# Patient Record
Sex: MALE | Race: BLACK OR AFRICAN AMERICAN | NOT HISPANIC OR LATINO | Employment: OTHER | URBAN - METROPOLITAN AREA
[De-identification: names, ages, dates, MRNs, and addresses within clinical notes are randomized per-mention and may not be internally consistent; named-entity substitution may affect disease eponyms.]

---

## 2023-08-23 LAB
ALANINE AMINOTRANSFERASE (SGPT) (U/L) IN SER/PLAS: 11 U/L (ref 10–52)
ALBUMIN (G/DL) IN SER/PLAS: 4.3 G/DL (ref 3.4–5)
ALKALINE PHOSPHATASE (U/L) IN SER/PLAS: 56 U/L (ref 33–136)
ASPARTATE AMINOTRANSFERASE (SGOT) (U/L) IN SER/PLAS: 14 U/L (ref 9–39)
BASOPHILS (10*3/UL) IN BLOOD BY AUTOMATED COUNT: 0.06 X10E9/L (ref 0–0.1)
BASOPHILS/100 LEUKOCYTES IN BLOOD BY AUTOMATED COUNT: 0.9 % (ref 0–2)
BILIRUBIN DIRECT (MG/DL) IN SER/PLAS: 0.1 MG/DL (ref 0–0.3)
BILIRUBIN TOTAL (MG/DL) IN SER/PLAS: 0.4 MG/DL (ref 0–1.2)
EOSINOPHILS (10*3/UL) IN BLOOD BY AUTOMATED COUNT: 0.47 X10E9/L (ref 0–0.4)
EOSINOPHILS/100 LEUKOCYTES IN BLOOD BY AUTOMATED COUNT: 7.2 % (ref 0–6)
ERYTHROCYTE DISTRIBUTION WIDTH (RATIO) BY AUTOMATED COUNT: 11.8 % (ref 11.5–14.5)
ERYTHROCYTE MEAN CORPUSCULAR HEMOGLOBIN CONCENTRATION (G/DL) BY AUTOMATED: 32 G/DL (ref 32–36)
ERYTHROCYTE MEAN CORPUSCULAR VOLUME (FL) BY AUTOMATED COUNT: 102 FL (ref 80–100)
ERYTHROCYTES (10*6/UL) IN BLOOD BY AUTOMATED COUNT: 4.18 X10E12/L (ref 4.5–5.9)
HEMATOCRIT (%) IN BLOOD BY AUTOMATED COUNT: 42.8 % (ref 41–52)
HEMOGLOBIN (G/DL) IN BLOOD: 13.7 G/DL (ref 13.5–17.5)
HEPATITIS A TOTAL AB INTERPRETATION: REACTIVE
HEPATITIS B VIRUS CORE AB (PRESENCE) IN SER/PLAS BY IMM: NONREACTIVE
HEPATITIS B VIRUS SURFACE AB (MIU/ML) IN SERUM: >1000 MIU/ML
HEPATITIS B VIRUS SURFACE AG PRESENCE IN SERUM: NONREACTIVE
HEPATITIS C VIRUS AB PRESENCE IN SERUM: REACTIVE
HIV 1/ 2 AG/AB SCREEN: NONREACTIVE
IMMATURE GRANULOCYTES/100 LEUKOCYTES IN BLOOD BY AUTOMATED COUNT: 0.2 % (ref 0–0.9)
INR IN PPP BY COAGULATION ASSAY: NORMAL
LEUKOCYTES (10*3/UL) IN BLOOD BY AUTOMATED COUNT: 6.5 X10E9/L (ref 4.4–11.3)
LYMPHOCYTES (10*3/UL) IN BLOOD BY AUTOMATED COUNT: 3.12 X10E9/L (ref 0.8–3)
LYMPHOCYTES/100 LEUKOCYTES IN BLOOD BY AUTOMATED COUNT: 48 % (ref 13–44)
MONOCYTES (10*3/UL) IN BLOOD BY AUTOMATED COUNT: 0.83 X10E9/L (ref 0.05–0.8)
MONOCYTES/100 LEUKOCYTES IN BLOOD BY AUTOMATED COUNT: 12.8 % (ref 2–10)
NEUTROPHILS (10*3/UL) IN BLOOD BY AUTOMATED COUNT: 2.01 X10E9/L (ref 1.6–5.5)
NEUTROPHILS/100 LEUKOCYTES IN BLOOD BY AUTOMATED COUNT: 30.9 % (ref 40–80)
NRBC (PER 100 WBCS) BY AUTOMATED COUNT: 0 /100 WBC (ref 0–0)
PLATELETS (10*3/UL) IN BLOOD AUTOMATED COUNT: 196 X10E9/L (ref 150–450)
PROTEIN TOTAL: 7.1 G/DL (ref 6.4–8.2)
PROTHROMBIN TIME (PT) IN PPP BY COAGULATION ASSAY: NORMAL

## 2023-08-24 LAB
HCV PCR QUANT: NOT DETECTED IU/ML
HCV RNA, PCR LOG: NORMAL LOG10 IU/ML

## 2023-09-27 DIAGNOSIS — M25.512 LEFT SHOULDER PAIN, UNSPECIFIED CHRONICITY: Primary | ICD-10-CM

## 2023-09-29 ENCOUNTER — HOSPITAL ENCOUNTER (OUTPATIENT)
Dept: DATA CONVERSION | Facility: HOSPITAL | Age: 73
End: 2023-09-29

## 2023-10-06 ENCOUNTER — DOCUMENTATION (OUTPATIENT)
Dept: PHYSICAL THERAPY | Facility: CLINIC | Age: 73
End: 2023-10-06
Payer: MEDICARE

## 2023-10-06 NOTE — PROGRESS NOTES
Physical Therapy                 Therapy Communication Note    Patient Name: Monae Berman  MRN: [id number]  Today's Date: 10/6/2023     Discipline: Physical Therapy    Missed Visit Reason:      Missed Time: No Show    Comment:

## 2023-10-13 ENCOUNTER — DOCUMENTATION (OUTPATIENT)
Dept: PHYSICAL THERAPY | Facility: CLINIC | Age: 73
End: 2023-10-13
Payer: MEDICARE

## 2023-10-13 NOTE — PROGRESS NOTES
Physical Therapy                 Therapy Communication Note    Patient Name: Monae Berman  MRN: [id number]  Today's Date: 10/13/2023     Discipline: Physical Therapy    Missed Visit Reason:      Missed Time: No Show    Comment:

## 2023-10-20 ENCOUNTER — DOCUMENTATION (OUTPATIENT)
Dept: PHYSICAL THERAPY | Facility: CLINIC | Age: 73
End: 2023-10-20
Payer: MEDICARE

## 2023-10-20 NOTE — PROGRESS NOTES
Physical Therapy                 Therapy Communication Note    Patient Name: Monae Berman  MRN: [id number]  Today's Date: 10/20/2023     Discipline: Physical Therapy    Missed Visit Reason:      Missed Time: No Show    Comment:

## 2023-10-27 ENCOUNTER — DOCUMENTATION (OUTPATIENT)
Dept: PHYSICAL THERAPY | Facility: CLINIC | Age: 73
End: 2023-10-27
Payer: MEDICARE

## 2023-10-27 NOTE — PROGRESS NOTES
Physical Therapy                 Therapy Communication Note    Patient Name: Monae Berman  MRN: [id number]  Today's Date: 10/27/2023     Discipline: Physical Therapy    Missed Visit Reason:      Missed Time: No Show    Comment:

## 2023-10-31 ENCOUNTER — DOCUMENTATION (OUTPATIENT)
Dept: PHYSICAL THERAPY | Facility: CLINIC | Age: 73
End: 2023-10-31
Payer: MEDICARE

## 2023-10-31 NOTE — PROGRESS NOTES
Physical Therapy                 Therapy Communication Note    Patient Name: Monae Berman  MRN: [id number]  Today's Date: 10/31/2023     Discipline: Physical Therapy    Missed Visit Reason:      Missed Time: No Show    Comment: Last scheduled.

## 2023-11-02 ENCOUNTER — DOCUMENTATION (OUTPATIENT)
Dept: PHYSICAL THERAPY | Facility: CLINIC | Age: 73
End: 2023-11-02
Payer: MEDICARE

## 2023-11-02 NOTE — PROGRESS NOTES
Physical Therapy    Discharge Summary    Name: Monae Berman  MRN: [id number]  : 1950  Date: 23    Discharge Summary: PT    Discharge Information: Date of discharge 10/31/2023    Therapy Summary: Pt with poor follow-up to PT.     Discharge Status: Discharge, follow-up with PCP     Rehab Discharge Reason: Attendance inconsistent and Failed to schedule and/or keep follow-up appointment(s)

## 2023-11-10 PROBLEM — M79.674 PAIN IN TOES OF BOTH FEET: Status: ACTIVE | Noted: 2023-11-10

## 2023-11-10 PROBLEM — M25.512 LEFT SHOULDER PAIN: Status: ACTIVE | Noted: 2023-11-10

## 2023-11-10 PROBLEM — B35.1 ONYCHOMYCOSIS: Status: ACTIVE | Noted: 2023-11-10

## 2023-11-10 PROBLEM — M79.675 PAIN IN TOES OF BOTH FEET: Status: ACTIVE | Noted: 2023-11-10

## 2023-11-10 RX ORDER — ATORVASTATIN CALCIUM 40 MG/1
40 TABLET, FILM COATED ORAL NIGHTLY
COMMUNITY
Start: 2023-01-25 | End: 2024-03-27 | Stop reason: SDUPTHER

## 2023-11-10 RX ORDER — POLYVINYL ALCOHOL 14 MG/ML
SOLUTION/ DROPS OPHTHALMIC
COMMUNITY
Start: 2017-11-16

## 2023-11-10 RX ORDER — TAMSULOSIN HYDROCHLORIDE 0.4 MG/1
0.4 CAPSULE ORAL NIGHTLY
COMMUNITY
Start: 2023-01-25 | End: 2024-01-07

## 2023-11-10 RX ORDER — ASPIRIN 325 MG
TABLET, DELAYED RELEASE (ENTERIC COATED) ORAL
COMMUNITY
Start: 2017-05-23

## 2023-11-10 RX ORDER — MULTIVITAMIN WITH FOLIC ACID 400 MCG
1 TABLET ORAL DAILY
COMMUNITY
End: 2024-01-07

## 2023-11-10 RX ORDER — ASPIRIN 81 MG/1
81 TABLET ORAL DAILY
COMMUNITY
Start: 2023-01-25

## 2023-11-10 RX ORDER — LEDIPASVIR AND SOFOSBUVIR 90; 400 MG/1; MG/1
TABLET, FILM COATED ORAL
COMMUNITY
Start: 2017-08-01

## 2023-11-10 RX ORDER — MICONAZOLE NITRATE 2 %
CREAM (GRAM) TOPICAL
COMMUNITY
Start: 2017-02-02

## 2023-11-10 RX ORDER — SILDENAFIL 100 MG/1
TABLET, FILM COATED ORAL
COMMUNITY
Start: 2023-08-23 | End: 2024-03-27 | Stop reason: SDUPTHER

## 2023-11-10 RX ORDER — FINASTERIDE 5 MG/1
5 TABLET, FILM COATED ORAL DAILY
COMMUNITY
Start: 2023-01-25 | End: 2024-03-27 | Stop reason: SDUPTHER

## 2023-11-10 RX ORDER — AMPICILLIN TRIHYDRATE 500 MG
25 CAPSULE ORAL DAILY
COMMUNITY

## 2024-01-05 DIAGNOSIS — R39.9 LOWER URINARY TRACT SYMPTOMS (LUTS): Primary | ICD-10-CM

## 2024-01-05 DIAGNOSIS — Z00.00 HEALTHCARE MAINTENANCE: ICD-10-CM

## 2024-01-07 RX ORDER — TAMSULOSIN HYDROCHLORIDE 0.4 MG/1
0.4 CAPSULE ORAL NIGHTLY
Qty: 90 CAPSULE | Refills: 0 | Status: SHIPPED | OUTPATIENT
Start: 2024-01-07 | End: 2024-06-04

## 2024-01-07 RX ORDER — MULTIVITAMIN WITH FOLIC ACID 400 MCG
1 TABLET ORAL DAILY
Qty: 90 TABLET | Refills: 0 | Status: SHIPPED | OUTPATIENT
Start: 2024-01-07 | End: 2024-03-27 | Stop reason: SDUPTHER

## 2024-03-27 ENCOUNTER — OFFICE VISIT (OUTPATIENT)
Dept: PRIMARY CARE | Facility: CLINIC | Age: 74
End: 2024-03-27
Payer: MEDICARE

## 2024-03-27 VITALS
DIASTOLIC BLOOD PRESSURE: 69 MMHG | WEIGHT: 179 LBS | HEART RATE: 75 BPM | OXYGEN SATURATION: 97 % | SYSTOLIC BLOOD PRESSURE: 101 MMHG | HEIGHT: 72 IN | BODY MASS INDEX: 24.24 KG/M2

## 2024-03-27 DIAGNOSIS — E78.5 HYPERLIPIDEMIA, UNSPECIFIED HYPERLIPIDEMIA TYPE: Primary | ICD-10-CM

## 2024-03-27 DIAGNOSIS — Z00.00 HEALTHCARE MAINTENANCE: ICD-10-CM

## 2024-03-27 DIAGNOSIS — N40.1 BENIGN PROSTATIC HYPERPLASIA WITH URINARY FREQUENCY: ICD-10-CM

## 2024-03-27 DIAGNOSIS — K76.9 LIVER DISEASE, UNSPECIFIED: ICD-10-CM

## 2024-03-27 DIAGNOSIS — N52.9 ERECTILE DISORDER: ICD-10-CM

## 2024-03-27 DIAGNOSIS — R35.0 BENIGN PROSTATIC HYPERPLASIA WITH URINARY FREQUENCY: ICD-10-CM

## 2024-03-27 DIAGNOSIS — S12.101S CLOSED NONDISPLACED FRACTURE OF SECOND CERVICAL VERTEBRA, UNSPECIFIED FRACTURE MORPHOLOGY, SEQUELA: ICD-10-CM

## 2024-03-27 PROCEDURE — 1159F MED LIST DOCD IN RCRD: CPT | Performed by: NURSE PRACTITIONER

## 2024-03-27 PROCEDURE — 99213 OFFICE O/P EST LOW 20 MIN: CPT | Performed by: NURSE PRACTITIONER

## 2024-03-27 RX ORDER — FINASTERIDE 5 MG/1
5 TABLET, FILM COATED ORAL DAILY
Qty: 90 TABLET | Refills: 3 | Status: SHIPPED | OUTPATIENT
Start: 2024-03-27 | End: 2025-03-27

## 2024-03-27 RX ORDER — SILDENAFIL 100 MG/1
100 TABLET, FILM COATED ORAL AS NEEDED
Qty: 30 TABLET | Refills: 1 | Status: SHIPPED | OUTPATIENT
Start: 2024-03-27

## 2024-03-27 RX ORDER — MULTIVITAMIN WITH FOLIC ACID 400 MCG
1 TABLET ORAL DAILY
Qty: 90 TABLET | Refills: 0 | Status: SHIPPED | OUTPATIENT
Start: 2024-03-27

## 2024-03-27 RX ORDER — ATORVASTATIN CALCIUM 40 MG/1
40 TABLET, FILM COATED ORAL NIGHTLY
Qty: 90 TABLET | Refills: 3 | Status: SHIPPED | OUTPATIENT
Start: 2024-03-27 | End: 2025-03-27

## 2024-03-27 NOTE — PATIENT INSTRUCTIONS
Make an appointment to follow up with Dr. Aman Bell [phone], or call her office at [phone] Denise Veras ()    Come back to see me in 3months

## 2024-03-27 NOTE — PROGRESS NOTES
Subjective   Patient ID: Monae Berman is a 73 y.o. male who presents for Annual Exam.    HPI   Mr. Berman is here for follow up. Hx of an accident falling of a roof. Initially was unable to move extremities, but now has arm movement and leg strength with left hemiplegia. New concern is his rotator cuff requesting referral for therapy. Reviewed medications. Needs to follow up with Hepatology for History of Hep C.     Review of Systems  12 point review of systems including (Constitutional, Eyes, ENMT, Respiratory, Cardiac, Gastrointestinal, Neurological, Psychiatric, and Hematologic) was performed and is otherwise negative     Objective   /69   Pulse 75   Ht 1.829 m (6')   Wt 81.2 kg (179 lb)   SpO2 97%   BMI 24.28 kg/m²     Physical Exam  General: Well groomed. Mood appropriate.  HEENT: MMM   Chest: CTA  Heart: RRR  Skin: warm, moist, intact buttock was reported by patient    Assessment/Plan   Diagnoses and all orders for this visit:  Hyperlipidemia, unspecified hyperlipidemia type  -     atorvastatin (Lipitor) 40 mg tablet; Take 1 tablet (40 mg) by mouth once daily at bedtime.  -     Lipid Panel; Future  Healthcare maintenance  -     multivitamin (Daily-Jenn, with folic acid,) tablet; Take 1 tablet by mouth once daily.  -     CBC; Future  -     Comprehensive Metabolic Panel; Future  -     Vitamin D 25-Hydroxy,Total (for eval of Vitamin D levels); Future  Erectile disorder  -     sildenafil (Viagra) 100 mg tablet; Take 1 tablet (100 mg) by mouth if needed for erectile dysfunction. Take 30-60 minutes before intercourse. Do not use more often than once a day. Use Condoms.  Benign prostatic hyperplasia with urinary frequency  -     finasteride (Proscar) 5 mg tablet; Take 1 tablet (5 mg) by mouth once daily.  Closed nondisplaced fracture of second cervical vertebra, unspecified fracture morphology, sequela  -     Walker rolling  Liver disease, unspecified  -     CBC; Future  -     Vitamin D 25-Hydroxy,Total  (for eval of Vitamin D levels); Future

## 2024-03-30 DIAGNOSIS — G89.29 CHRONIC LEFT SHOULDER PAIN: Primary | ICD-10-CM

## 2024-03-30 DIAGNOSIS — M25.512 CHRONIC LEFT SHOULDER PAIN: Primary | ICD-10-CM

## 2024-06-03 DIAGNOSIS — R39.9 LOWER URINARY TRACT SYMPTOMS (LUTS): ICD-10-CM

## 2024-06-04 RX ORDER — TAMSULOSIN HYDROCHLORIDE 0.4 MG/1
0.4 CAPSULE ORAL NIGHTLY
Qty: 90 CAPSULE | Refills: 0 | Status: SHIPPED | OUTPATIENT
Start: 2024-06-04

## 2024-06-19 ENCOUNTER — OFFICE VISIT (OUTPATIENT)
Dept: PRIMARY CARE | Facility: CLINIC | Age: 74
End: 2024-06-19
Payer: MEDICARE

## 2024-06-19 VITALS
DIASTOLIC BLOOD PRESSURE: 73 MMHG | OXYGEN SATURATION: 98 % | RESPIRATION RATE: 18 BRPM | HEART RATE: 97 BPM | TEMPERATURE: 97.9 F | SYSTOLIC BLOOD PRESSURE: 105 MMHG

## 2024-06-19 DIAGNOSIS — S12.100G CLOSED DISPLACED FRACTURE OF SECOND CERVICAL VERTEBRA WITH DELAYED HEALING, UNSPECIFIED FRACTURE MORPHOLOGY, SUBSEQUENT ENCOUNTER: Primary | ICD-10-CM

## 2024-06-19 DIAGNOSIS — N52.9 ERECTILE DISORDER: ICD-10-CM

## 2024-06-19 DIAGNOSIS — E55.9 HYPOVITAMINOSIS D: ICD-10-CM

## 2024-06-19 DIAGNOSIS — R39.9 LOWER URINARY TRACT SYMPTOMS (LUTS): ICD-10-CM

## 2024-06-19 DIAGNOSIS — E86.0 DEHYDRATION: ICD-10-CM

## 2024-06-19 LAB
ALBUMIN SERPL BCP-MCNC: 4.7 G/DL (ref 3.4–5)
ANION GAP SERPL CALC-SCNC: 17 MMOL/L (ref 10–20)
BUN SERPL-MCNC: 18 MG/DL (ref 6–23)
CALCIUM SERPL-MCNC: 9.9 MG/DL (ref 8.6–10.6)
CHLORIDE SERPL-SCNC: 101 MMOL/L (ref 98–107)
CO2 SERPL-SCNC: 21 MMOL/L (ref 21–32)
CREAT SERPL-MCNC: 1.17 MG/DL (ref 0.5–1.3)
EGFRCR SERPLBLD CKD-EPI 2021: 66 ML/MIN/1.73M*2
GLUCOSE SERPL-MCNC: 105 MG/DL (ref 74–99)
PHOSPHATE SERPL-MCNC: 3.6 MG/DL (ref 2.5–4.9)
POTASSIUM SERPL-SCNC: 5.2 MMOL/L (ref 3.5–5.3)
SODIUM SERPL-SCNC: 134 MMOL/L (ref 136–145)

## 2024-06-19 PROCEDURE — 80069 RENAL FUNCTION PANEL: CPT | Performed by: NURSE PRACTITIONER

## 2024-06-19 PROCEDURE — 1126F AMNT PAIN NOTED NONE PRSNT: CPT | Performed by: NURSE PRACTITIONER

## 2024-06-19 PROCEDURE — 99213 OFFICE O/P EST LOW 20 MIN: CPT | Performed by: NURSE PRACTITIONER

## 2024-06-19 PROCEDURE — 1159F MED LIST DOCD IN RCRD: CPT | Performed by: NURSE PRACTITIONER

## 2024-06-19 PROCEDURE — 36415 COLL VENOUS BLD VENIPUNCTURE: CPT | Performed by: NURSE PRACTITIONER

## 2024-06-19 RX ORDER — AMPICILLIN TRIHYDRATE 500 MG
25 CAPSULE ORAL DAILY
Qty: 90 CAPSULE | Refills: 3 | Status: SHIPPED | OUTPATIENT
Start: 2024-06-19 | End: 2025-06-19

## 2024-06-19 RX ORDER — SILDENAFIL 100 MG/1
100 TABLET, FILM COATED ORAL AS NEEDED
Qty: 30 TABLET | Refills: 1 | Status: SHIPPED | OUTPATIENT
Start: 2024-06-19

## 2024-06-19 SDOH — ECONOMIC STABILITY: FOOD INSECURITY: WITHIN THE PAST 12 MONTHS, THE FOOD YOU BOUGHT JUST DIDN'T LAST AND YOU DIDN'T HAVE MONEY TO GET MORE.: SOMETIMES TRUE

## 2024-06-19 SDOH — ECONOMIC STABILITY: FOOD INSECURITY: WITHIN THE PAST 12 MONTHS, YOU WORRIED THAT YOUR FOOD WOULD RUN OUT BEFORE YOU GOT MONEY TO BUY MORE.: SOMETIMES TRUE

## 2024-06-19 ASSESSMENT — ENCOUNTER SYMPTOMS
OCCASIONAL FEELINGS OF UNSTEADINESS: 1
DEPRESSION: 0
LOSS OF SENSATION IN FEET: 1

## 2024-06-19 ASSESSMENT — PAIN SCALES - GENERAL: PAINLEVEL: 0-NO PAIN

## 2024-06-19 ASSESSMENT — LIFESTYLE VARIABLES: HOW MANY STANDARD DRINKS CONTAINING ALCOHOL DO YOU HAVE ON A TYPICAL DAY: PATIENT DOES NOT DRINK

## 2024-06-19 NOTE — PROGRESS NOTES
Subjective   Patient ID: Monae Berman is a 73 y.o. male who presents for Annual Exam (PHYSICAL).    HPI   PMH significant for an accident falling off of a roof. Initially was unable to move extremities, but now has arm movement and some leg strength with left hemiplegia. He is Hep C positive and has not been seen by hepatology since May 2023 was to be seen in 3 months.  Labs done at that time patient was positive for Hep c antibodies, liver scan should mild fibrotic changes, has not received treatment.  Patients concern today is the need for a hospital bed and a walker. He has been sleeping on a couch and it is not at all comfortable. He needs the ability to make adjustments. Also will need PT again to work with him with the walker.    Review of Systems  12 point review of systems including (Constitutional, Eyes, ENMT, Respiratory, Cardiac, Gastrointestinal, Neurological, Psychiatric, and Hematologic) was performed and is otherwise negative     Objective   /73   Pulse 97   Temp 36.6 °C (97.9 °F)   Resp 18   SpO2 98%     Physical Exam  General: Poorly groomed with urine odor and soiled clothing. Mood appropriate.  HEENT: Dry oral cavity  Chest: CTA  Heart: RRR  Ext: no edema  Skin: warm, moist, intact   Assessment/Plan   Diagnoses and all orders for this visit:  Closed displaced fracture of second cervical vertebra with delayed healing, unspecified fracture morphology, subsequent encounter  -     Hospital Bed  -     walker misc; One Rolator walker for support in patient with C2 fracture now healed.  -     Referral to Physical Therapy; Future  Erectile disorder  -     sildenafil (Viagra) 100 mg tablet; Take 1 tablet (100 mg) by mouth if needed for erectile dysfunction. Take 30-60 minutes before intercourse. Do not use more often than once a day. Use Condoms.  Hypovitaminosis D  -     Vitamin D3 25 mcg (1,000 unit) capsule; Take 1 capsule (25 mcg) by mouth once daily.  Dehydration  -     Renal Function  Panel  - Encouraged increased liquids especially water.   Other orders  -     Follow Up In Primary Care - Established; Future     Patient was identified as a fall risk. Risk prevention instructions provided.

## 2024-06-21 PROBLEM — R39.9 LOWER URINARY TRACT SYMPTOMS (LUTS): Status: ACTIVE | Noted: 2024-06-21

## 2024-06-21 PROBLEM — N52.9 ERECTILE DISORDER: Status: ACTIVE | Noted: 2024-06-21

## 2024-06-21 PROBLEM — E55.9 HYPOVITAMINOSIS D: Status: ACTIVE | Noted: 2024-06-21

## 2024-07-18 ENCOUNTER — APPOINTMENT (OUTPATIENT)
Dept: RADIOLOGY | Facility: HOSPITAL | Age: 74
End: 2024-07-18
Payer: MEDICARE

## 2024-07-18 ENCOUNTER — HOSPITAL ENCOUNTER (EMERGENCY)
Facility: HOSPITAL | Age: 74
Discharge: HOME | End: 2024-07-18
Attending: EMERGENCY MEDICINE
Payer: MEDICARE

## 2024-07-18 VITALS
DIASTOLIC BLOOD PRESSURE: 80 MMHG | BODY MASS INDEX: 24.38 KG/M2 | TEMPERATURE: 97.3 F | OXYGEN SATURATION: 100 % | HEART RATE: 81 BPM | WEIGHT: 180 LBS | RESPIRATION RATE: 18 BRPM | SYSTOLIC BLOOD PRESSURE: 116 MMHG | HEIGHT: 72 IN

## 2024-07-18 DIAGNOSIS — M25.512 ACUTE PAIN OF LEFT SHOULDER: Primary | ICD-10-CM

## 2024-07-18 PROCEDURE — 99284 EMERGENCY DEPT VISIT MOD MDM: CPT

## 2024-07-18 PROCEDURE — 73080 X-RAY EXAM OF ELBOW: CPT | Mod: LT

## 2024-07-18 PROCEDURE — 73080 X-RAY EXAM OF ELBOW: CPT | Mod: LEFT SIDE | Performed by: RADIOLOGY

## 2024-07-18 PROCEDURE — 73030 X-RAY EXAM OF SHOULDER: CPT | Mod: LT

## 2024-07-18 PROCEDURE — 2500000001 HC RX 250 WO HCPCS SELF ADMINISTERED DRUGS (ALT 637 FOR MEDICARE OP): Mod: SE

## 2024-07-18 PROCEDURE — 73030 X-RAY EXAM OF SHOULDER: CPT | Mod: LEFT SIDE | Performed by: RADIOLOGY

## 2024-07-18 PROCEDURE — 99284 EMERGENCY DEPT VISIT MOD MDM: CPT | Performed by: EMERGENCY MEDICINE

## 2024-07-18 RX ORDER — ACETAMINOPHEN 325 MG/1
975 TABLET ORAL ONCE
Status: COMPLETED | OUTPATIENT
Start: 2024-07-18 | End: 2024-07-18

## 2024-07-18 RX ORDER — ACETAMINOPHEN 325 MG/1
650 TABLET ORAL EVERY 6 HOURS PRN
Qty: 80 TABLET | Refills: 0 | Status: SHIPPED | OUTPATIENT
Start: 2024-07-18 | End: 2024-07-28

## 2024-07-18 RX ORDER — OXYCODONE HYDROCHLORIDE 5 MG/1
5 TABLET ORAL ONCE
Status: COMPLETED | OUTPATIENT
Start: 2024-07-18 | End: 2024-07-18

## 2024-07-18 ASSESSMENT — PAIN DESCRIPTION - ORIENTATION
ORIENTATION: LEFT

## 2024-07-18 ASSESSMENT — PAIN DESCRIPTION - LOCATION
LOCATION: SHOULDER
LOCATION: ARM
LOCATION: SHOULDER

## 2024-07-18 ASSESSMENT — PAIN SCALES - GENERAL
PAINLEVEL_OUTOF10: 10 - WORST POSSIBLE PAIN

## 2024-07-18 ASSESSMENT — PAIN DESCRIPTION - PAIN TYPE: TYPE: ACUTE PAIN

## 2024-07-18 ASSESSMENT — COLUMBIA-SUICIDE SEVERITY RATING SCALE - C-SSRS
2. HAVE YOU ACTUALLY HAD ANY THOUGHTS OF KILLING YOURSELF?: NO
1. IN THE PAST MONTH, HAVE YOU WISHED YOU WERE DEAD OR WISHED YOU COULD GO TO SLEEP AND NOT WAKE UP?: NO
6. HAVE YOU EVER DONE ANYTHING, STARTED TO DO ANYTHING, OR PREPARED TO DO ANYTHING TO END YOUR LIFE?: NO

## 2024-07-18 ASSESSMENT — PAIN - FUNCTIONAL ASSESSMENT: PAIN_FUNCTIONAL_ASSESSMENT: 0-10

## 2024-07-18 ASSESSMENT — PAIN SCALES - PAIN ASSESSMENT IN ADVANCED DEMENTIA (PAINAD): TOTALSCORE: MEDICATION (SEE MAR)

## 2024-07-18 NOTE — ED TRIAGE NOTES
Pt to ED c/o left shoulder pain after a fall last evening trying to get up out of bed. Pt states he did not hit his head or lose consciousness. Pt denies any use of thinners. Pt recently tore his rotator cuff and after his neighbors helped him get up, he feels like his shoulder is dislocated.

## 2024-07-18 NOTE — ED PROVIDER NOTES
CC: Shoulder Pain     History provided by: Patient  Limitations to History: None    HPI:  Patient is a 73-year-old male with a PMH of erectile dysfunction who presents to the emergency department for a chief complaint of left shoulder pain.  Patient reports a fall upon arising from his bed yesterday.  He reports that he fell on his left shoulder.  Patient denies head strike, loss of consciousness, or anticoagulation use.  Patient denies any preceding symptoms of chest pain, shortness of breath, or dizziness.  Patient reports a history of chronic rotator cuff tear of his left shoulder.  Patient denies chest pain, or shortness of breath, abdominal pain.    External Records Reviewed: Previous ED records, inpatient records, and outpatient records   ???????????????????????????????????????????????????????????????  Triage Vitals:  T 36.3 °C (97.3 °F)  HR 81  /80  RR 18  O2 100 %      Physical Exam  Constitutional:       General: He is awake. He is not in acute distress.     Appearance: He is not ill-appearing, toxic-appearing or diaphoretic.   Cardiovascular:      Rate and Rhythm: Normal rate and regular rhythm.      Heart sounds: Normal heart sounds, S1 normal and S2 normal. Heart sounds not distant. No murmur heard.  Pulmonary:      Effort: Pulmonary effort is normal. No respiratory distress.      Breath sounds: Normal breath sounds.   Musculoskeletal:      Comments: 5/5 strength in Left shoulder flexion, extension, abduction, and abduction.  The left upper extremity is neuro intact in all nerve distributions.  There are no overt signs of trauma.  There is tenderness to palpitation over the left olecranon and left AC joint.  Empty can testing is positive on the left.   Neurological:      Mental Status: He is alert.   Psychiatric:         Behavior: Behavior is cooperative.        ???????????????????????????????????????????????????????????????  ED Course/Treatment/Medical Decision Making    Independent  Interpretation of Studies:  I independently interpreted: Left shoulder x-ray, left elbow x-ray    Differential diagnoses considered include but ar not limited to: Shoulder fracture, shoulder dislocation, left elbow fracture, muscular contusion, muscular strain, rotator cuff tear    Social Determinants Limiting Care:  None identified         ED Course:  ED Course as of 07/21/24 2320   Thu Jul 18, 2024   0859 Patient coming in after he landed on the ground out of his bed and landed on his left shoulder.  He thinks that it might be dislocated or possibly fractured.  He was initially placed in 39 otto but was found to have bedbugs so he is being moved into a room.  Plan to undress, examine, and obtain x-rays.  No reported head trauma.  No reported chest pain shortness of breath or loss of consciousness. [DM]      ED Course User Index  [DM] Norberto Pederson MD         Diagnoses as of 07/21/24 2320   Acute pain of left shoulder       MDM:    Patient is a 73-year-old male with above PMH who presents to the emergency department for a chief complaint of left shoulder pain.  Upon arrival patient's vital signs are within normal limits, he is nontoxic-appearing, and appears in no acute distress.  No overt signs of trauma on physical examination.  The patient's pain will be treated with Tylenol and oxycodone.  X-ray of the left shoulder remarkable for degenerative changes without fracture or dislocation.  X-ray left elbow remarkable for no dislocation or fracture but with degenerative changes.  Upon reevaluation the patient's pain has improved.  Patient was discharged home in stable condition with orthopedic surgery follow-up and a prescription for Tylenol.    Impression:  Acute pain left shoulder    Disposition:  Discharge home    Patient was staffed and discussed with ED attending Dr. Noe Sierra, DO   Emergency Medicine, PGY-2      Procedures ? Smart[employer] last updated 7/18/2024 7:41 AM          Rell DOTSON  DO Rigo  Resident  07/21/24 0113    I saw and evaluated the patient. I personally obtained the key and critical portions of the history and physical exam or was physically present for key and critical portions performed by the resident/fellow/YESSY. I reviewed the resident/fellow/YESSY's documentation and discussed the patient with the resident/fellow/YESSY. I agree with the resident/fellow/YESSY's medical decision making as documented in the note.    ** Please excuse any errors in grammar or translation related to this dictation. Voice recognition software was utilized to prepare this document. **       Norberto Pederson MD  [facility]        Norberto Pederson MD  07/22/24 5947

## 2024-09-06 ENCOUNTER — APPOINTMENT (OUTPATIENT)
Dept: ORTHOPEDIC SURGERY | Facility: CLINIC | Age: 74
End: 2024-09-06
Payer: MEDICARE

## 2024-09-06 DIAGNOSIS — M25.512 ACUTE PAIN OF LEFT SHOULDER: ICD-10-CM

## 2024-09-06 DIAGNOSIS — M12.812 ROTATOR CUFF ARTHROPATHY OF LEFT SHOULDER: Primary | ICD-10-CM

## 2024-09-06 DIAGNOSIS — M19.012 OSTEOARTHRITIS OF LEFT SHOULDER, UNSPECIFIED OSTEOARTHRITIS TYPE: ICD-10-CM

## 2024-09-06 NOTE — PROGRESS NOTES
Subjective   Monae Berman is a 74 y.o. male who presents for Pain of the Left Shoulder    Consulting physician: Norberto Pederson MD    HPI  75 yo M presenting to clinic for 1.5 years of L shoulder pain. Pt has PMH spinal cord injury from 80s (fell of ladder, reports thoracic? fusion) currently wheelchair bound with PMH of L rotator cuff arthropathy, seen by Dr. Colmenares 7/18/2023, went to ED on 7/18/2024 after fall with shoulder xrays at that time with no fracture.  Currently on no anti-inflammatories, has never done physical therapy or had any injections performed.  He reports his arm has slowly gotten worse over the past year his main issue is that he has had difficulties with his right arm from a spinal cord injury and now has eczema his left arm for activities of daily living, he was referred manage his left shoulder weakness and discomfort is limiting his function.  His main goal is to improve his left shoulder function so that he can perform his ADLs.  He denies any interval trauma, numbness, paresthesias.    ROS: All pertinent positive symptoms are included in the history of present illness.    All other systems have been reviewed and are negative and noncontributory to this patient's current ailments.    Objective     There were no vitals filed for this visit.    Physical Exam  General Exam:  Constitutional - NAD, AAO x 3, conversing appropriately.  HEENT- Normocephalic and atraumatic. No facial deformities. Hearing grossly normal.  Lungs - Breathing non-labored with normal rate. No accessory muscle use.  CV - Extremities warm and well-perfused, brisk capillary refill present.   Neuro - CN II-XII grossly intact.  LEFT Shoulder Exam:  No swelling, warmth or ecchymosis of shoulder present.   AROM: Flexion [120 ] [id number]; Abduction [80] [id number]; deferred as wheelchair bound and unable to reach behind back  NTTP over clavicle, AC joint, subacromial space, posterior GHJ line, LHB tendon, deltoid,  trapezius  [4]/5 strength in ER, IR, abduction, flexion   SILT in all dermatomal distributions C5-T1  Strong radial pulse, can make ok sign, thumbs up, wrist extension  LABRUM: [+] O´dirk´s,  INSTABILITY: Deferred  IMPINGEMENT:  [+] Hawkin´s, [+] Neer´s  ROTATOR CUFF: [+] Empty Can (SS), [+] Belly press (SSc)    IMAGING:  Xrays of L shoulder obtained on 7/18 reviewed and independently interpreted as:  No fracture, moderate to severe osteoarthritis    PROCEDURES:  Procedures      Assessment/Plan   Problem List Items Addressed This Visit          Musculoskeletal and Injuries    Left shoulder pain    Relevant Orders    Referral to Physical Therapy     Other Visit Diagnoses       Rotator cuff arthropathy of left shoulder    -  Primary    Relevant Orders    Referral to Physical Therapy    Osteoarthritis of left shoulder, unspecified osteoarthritis type        Relevant Orders    Referral to Physical Therapy          DIAGNOSIS: Rotator cuff arthropathy of left shoulder, left shoulder osteoarthritis    ASSESSMENT/PLAN:  Left shoulder pain most consistent with rotator cuff arthropathy and glenohumeral osteoarthritis with limited active ROM, diffuse weakness of cuff muscles.    Offered corticosteroid injection: Indication to try to get patient some symptomatic relief that could potentially improve his participation in physical therapy reporting that he is limited on exam today. Deferred as wants longterm fix. Declined meloxicam. Very agreeable to physical therapy with goal to improve strength, ROM, decrease pain.  No specific restrictions. All activities as tolerated.   Follow-up: As needed for pain or if not improving    All questions answered and patient is agreeable to plan of care.    Seun Gallardo MD PGY-4  Primary Care Sports Medicine Fellow  [facility] [place]  [facility]

## 2024-09-25 ENCOUNTER — APPOINTMENT (OUTPATIENT)
Dept: PRIMARY CARE | Facility: CLINIC | Age: 74
End: 2024-09-25
Payer: MEDICARE

## 2024-10-02 ENCOUNTER — OFFICE VISIT (OUTPATIENT)
Dept: PRIMARY CARE | Facility: CLINIC | Age: 74
End: 2024-10-02
Payer: MEDICARE

## 2024-10-02 VITALS
HEIGHT: 72 IN | DIASTOLIC BLOOD PRESSURE: 78 MMHG | TEMPERATURE: 97.3 F | WEIGHT: 168.6 LBS | SYSTOLIC BLOOD PRESSURE: 112 MMHG | BODY MASS INDEX: 22.84 KG/M2 | HEART RATE: 69 BPM | RESPIRATION RATE: 16 BRPM | OXYGEN SATURATION: 94 %

## 2024-10-02 DIAGNOSIS — M25.50 MULTIPLE JOINT PAIN: ICD-10-CM

## 2024-10-02 DIAGNOSIS — M25.512 CHRONIC LEFT SHOULDER PAIN: ICD-10-CM

## 2024-10-02 DIAGNOSIS — R39.9 LOWER URINARY TRACT SYMPTOMS (LUTS): ICD-10-CM

## 2024-10-02 DIAGNOSIS — H04.129 DRY EYE: Primary | ICD-10-CM

## 2024-10-02 DIAGNOSIS — N52.9 ERECTILE DISORDER: ICD-10-CM

## 2024-10-02 DIAGNOSIS — S12.100G CLOSED DISPLACED FRACTURE OF SECOND CERVICAL VERTEBRA WITH DELAYED HEALING, UNSPECIFIED FRACTURE MORPHOLOGY, SUBSEQUENT ENCOUNTER: ICD-10-CM

## 2024-10-02 DIAGNOSIS — G89.29 CHRONIC LEFT SHOULDER PAIN: ICD-10-CM

## 2024-10-02 PROCEDURE — 3008F BODY MASS INDEX DOCD: CPT | Performed by: NURSE PRACTITIONER

## 2024-10-02 PROCEDURE — 1159F MED LIST DOCD IN RCRD: CPT | Performed by: NURSE PRACTITIONER

## 2024-10-02 PROCEDURE — 1125F AMNT PAIN NOTED PAIN PRSNT: CPT | Performed by: NURSE PRACTITIONER

## 2024-10-02 PROCEDURE — 99214 OFFICE O/P EST MOD 30 MIN: CPT | Performed by: NURSE PRACTITIONER

## 2024-10-02 PROCEDURE — 90662 IIV NO PRSV INCREASED AG IM: CPT | Performed by: NURSE PRACTITIONER

## 2024-10-02 RX ORDER — SILDENAFIL 100 MG/1
100 TABLET, FILM COATED ORAL AS NEEDED
Qty: 30 TABLET | Refills: 3 | Status: SHIPPED | OUTPATIENT
Start: 2024-10-02

## 2024-10-02 RX ORDER — TAMSULOSIN HYDROCHLORIDE 0.4 MG/1
0.4 CAPSULE ORAL NIGHTLY
Qty: 90 CAPSULE | Refills: 3 | Status: SHIPPED | OUTPATIENT
Start: 2024-10-02 | End: 2025-10-02

## 2024-10-02 SDOH — ECONOMIC STABILITY: FOOD INSECURITY: WITHIN THE PAST 12 MONTHS, YOU WORRIED THAT YOUR FOOD WOULD RUN OUT BEFORE YOU GOT MONEY TO BUY MORE.: SOMETIMES TRUE

## 2024-10-02 SDOH — ECONOMIC STABILITY: FOOD INSECURITY: WITHIN THE PAST 12 MONTHS, THE FOOD YOU BOUGHT JUST DIDN'T LAST AND YOU DIDN'T HAVE MONEY TO GET MORE.: SOMETIMES TRUE

## 2024-10-02 ASSESSMENT — PAIN SCALES - GENERAL: PAINLEVEL: 6

## 2024-10-02 ASSESSMENT — LIFESTYLE VARIABLES: HOW OFTEN DO YOU HAVE SIX OR MORE DRINKS ON ONE OCCASION: LESS THAN MONTHLY

## 2024-10-02 ASSESSMENT — ENCOUNTER SYMPTOMS
LOSS OF SENSATION IN FEET: 0
DEPRESSION: 0
OCCASIONAL FEELINGS OF UNSTEADINESS: 1

## 2024-10-02 ASSESSMENT — PATIENT HEALTH QUESTIONNAIRE - PHQ9
2. FEELING DOWN, DEPRESSED OR HOPELESS: NOT AT ALL
1. LITTLE INTEREST OR PLEASURE IN DOING THINGS: NOT AT ALL
SUM OF ALL RESPONSES TO PHQ9 QUESTIONS 1 AND 2: 0

## 2024-10-02 NOTE — PROGRESS NOTES
Subjective   Patient ID: Monae Berman is a 74 y.o. male who presents for Follow-up.    HPI   Patient here for fu. Since last visit with me was seen in the ED in July after a fall. He was found to have bedbugs at that time. Patient reports he continues to have bedbugs, they keep him up at night, none visible today. In the ED fracture and dislocation were ruled out and pain was treated with Tylenol and oxycodone. He was noted to have some degenerative changes in the left shoulder and elbow at that time. Referred to ortho. Seen by ortho on 9/06, offered steroid injection but refused referred to Still has not followed up with hepatology for his diagnosis of Hepatitis C.  Had requested dietary supplements but was denied based on stable weight, and BMI within normal limits. Over the past two years weight has fluctuated from 180 to 168.   BPH on Proscar and Flomax. Has ED and uses Viagra. Sexually active with women.   Requesting recommendation for a two bedroom in his [facility] building so that he could have a full time aide.     Review of Systems  12 point review of systems including (Constitutional, Eyes, ENMT, Respiratory, Cardiac, Gastrointestinal, Neurological, Psychiatric, and Hematologic) was performed and is otherwise negative   Objective   /78   Pulse 69   Temp 36.3 °C (97.3 °F)   Resp 16   Ht 1.829 m (6')   Wt 76.5 kg (168 lb 9.6 oz)   SpO2 94%   BMI 22.87 kg/m²     Physical Exam  General: Unkempt. Wheelchair very soiled. Pad has rips and tears and needs replacement.  HEENT: MMM   Chest: CTA  Heart: RRR  Ext: 1+ pitting edema    Assessment/Plan   Diagnoses and all orders for this visit:  Dry eye  -     Referral to Ophthalmology; Future  Erectile disorder  -     sildenafil (Viagra) 100 mg tablet; Take 1 tablet (100 mg) by mouth if needed for erectile dysfunction. Take 30-60 minutes before intercourse. Do not use more often than once a day. Use Condoms.  Lower urinary tract symptoms (LUTS)  -      tamsulosin (Flomax) 0.4 mg 24 hr capsule; Take 1 capsule (0.4 mg) by mouth once daily at bedtime.  Chronic left shoulder pain  -     Referral to Pain Medicine; Future  Multiple joint pain  -     Referral to Pain Medicine; Future  Closed displaced fracture of second cervical vertebra with delayed healing, unspecified fracture morphology, subsequent encounter  -     Wheelchair cushion  Other orders  -     Flu vaccine, trivalent, preservative free, HIGH-DOSE, age 65y+ (Fluzone)

## 2024-10-02 NOTE — PATIENT INSTRUCTIONS
Good to see you today. I renewed the Viagra and the tamsulosin. You received the flu vaccine. I referred you to the eye doctor and to pain medicine. I ordered a cushion for your chair and I will try again for the Boost.

## 2024-10-02 NOTE — PROGRESS NOTES
Pt received Flu Vaccine.  Afebrile, no complaints, influenza VIS given and reviewed.  Flu vaccine screening completed  Flulaval given left deltoid  Pt tolerated well.

## 2024-10-03 ENCOUNTER — APPOINTMENT (OUTPATIENT)
Dept: PRIMARY CARE | Facility: CLINIC | Age: 74
End: 2024-10-03
Payer: MEDICARE

## 2024-10-08 ENCOUNTER — APPOINTMENT (OUTPATIENT)
Dept: OCCUPATIONAL THERAPY | Facility: HOSPITAL | Age: 74
End: 2024-10-08
Payer: MEDICARE

## 2024-10-09 DIAGNOSIS — N40.1 BENIGN PROSTATIC HYPERPLASIA WITH URINARY FREQUENCY: ICD-10-CM

## 2024-10-09 DIAGNOSIS — Z00.00 HEALTHCARE MAINTENANCE: ICD-10-CM

## 2024-10-09 DIAGNOSIS — E78.5 HYPERLIPIDEMIA, UNSPECIFIED HYPERLIPIDEMIA TYPE: ICD-10-CM

## 2024-10-09 DIAGNOSIS — R35.0 BENIGN PROSTATIC HYPERPLASIA WITH URINARY FREQUENCY: ICD-10-CM

## 2024-10-13 PROBLEM — M25.50 MULTIPLE JOINT PAIN: Status: ACTIVE | Noted: 2024-10-13

## 2024-10-13 PROBLEM — H04.129 DRY EYE: Status: ACTIVE | Noted: 2024-10-13

## 2024-10-14 RX ORDER — FINASTERIDE 5 MG/1
5 TABLET, FILM COATED ORAL DAILY
Qty: 90 TABLET | Refills: 3 | Status: SHIPPED | OUTPATIENT
Start: 2024-10-14 | End: 2025-10-14

## 2024-10-14 RX ORDER — MULTIVITAMIN WITH FOLIC ACID 400 MCG
1 TABLET ORAL DAILY
Qty: 90 TABLET | Refills: 0 | Status: SHIPPED | OUTPATIENT
Start: 2024-10-14

## 2024-10-14 RX ORDER — ATORVASTATIN CALCIUM 40 MG/1
40 TABLET, FILM COATED ORAL NIGHTLY
Qty: 90 TABLET | Refills: 3 | Status: SHIPPED | OUTPATIENT
Start: 2024-10-14 | End: 2025-10-14

## 2024-10-25 ENCOUNTER — EVALUATION (OUTPATIENT)
Dept: OCCUPATIONAL THERAPY | Facility: HOSPITAL | Age: 74
End: 2024-10-25
Payer: MEDICARE

## 2024-10-25 DIAGNOSIS — M19.012 PRIMARY OSTEOARTHRITIS, LEFT SHOULDER: ICD-10-CM

## 2024-10-25 DIAGNOSIS — M12.812 OTHER SPECIFIC ARTHROPATHIES, NOT ELSEWHERE CLASSIFIED, LEFT SHOULDER: Primary | ICD-10-CM

## 2024-10-25 DIAGNOSIS — M25.512 PAIN IN LEFT SHOULDER: ICD-10-CM

## 2024-10-25 PROCEDURE — 97167 OT EVAL HIGH COMPLEX 60 MIN: CPT | Mod: GO

## 2024-10-25 PROCEDURE — 97110 THERAPEUTIC EXERCISES: CPT | Mod: GO

## 2024-10-28 DIAGNOSIS — E55.9 HYPOVITAMINOSIS D: ICD-10-CM

## 2024-10-28 ASSESSMENT — ENCOUNTER SYMPTOMS
LOSS OF SENSATION IN FEET: 0
OCCASIONAL FEELINGS OF UNSTEADINESS: 1
DEPRESSION: 0

## 2024-10-29 RX ORDER — AMPICILLIN TRIHYDRATE 500 MG
25 CAPSULE ORAL DAILY
Qty: 90 CAPSULE | Refills: 3 | Status: SHIPPED | OUTPATIENT
Start: 2024-10-29 | End: 2025-10-29

## 2024-11-08 ENCOUNTER — APPOINTMENT (OUTPATIENT)
Dept: PAIN MEDICINE | Facility: CLINIC | Age: 74
End: 2024-11-08
Payer: MEDICARE

## 2024-11-21 ENCOUNTER — APPOINTMENT (OUTPATIENT)
Dept: PAIN MEDICINE | Facility: CLINIC | Age: 74
End: 2024-11-21
Payer: MEDICARE

## 2024-11-21 DIAGNOSIS — G89.29 CHRONIC LEFT SHOULDER PAIN: ICD-10-CM

## 2024-11-21 DIAGNOSIS — M25.512 CHRONIC LEFT SHOULDER PAIN: ICD-10-CM

## 2024-11-21 DIAGNOSIS — M25.50 MULTIPLE JOINT PAIN: ICD-10-CM

## 2024-11-21 PROCEDURE — 99204 OFFICE O/P NEW MOD 45 MIN: CPT | Performed by: PAIN MEDICINE

## 2024-11-21 PROCEDURE — 1125F AMNT PAIN NOTED PAIN PRSNT: CPT | Performed by: PAIN MEDICINE

## 2024-11-21 ASSESSMENT — PAIN SCALES - GENERAL
PAINLEVEL_OUTOF10: 6
PAINLEVEL_OUTOF10: 6

## 2024-11-21 ASSESSMENT — PAIN - FUNCTIONAL ASSESSMENT: PAIN_FUNCTIONAL_ASSESSMENT: 0-10

## 2024-11-21 NOTE — PROGRESS NOTES
Subjective   Patient ID: Monae Berman is a 74 y.o. male       HPI: 74 years old male with past medical history of spinal cord injurie from 1980s, currently wheelchair bound with PMH of L rotator cuff arthropathy , presents with left shoulder pain for the last 2 years that has been bothering him more recently, patient describes the pain as sharp and aggravated by movement at the shoulder, he rates the pain as 7-8 out of 10, there is limited range of motion and diffuse weakness to the left shoulder,    Of note patient saw sports medicine in September and was offered a corticosteroid injection which he deferred at the time.          Physical Therapy: The patient has done six or more weeks of physical therapy in the past six months with minimal improvement    Classes of medications tried in the past: Acetaminophen and Opioids          Review of Systems   13-point ROS done and negative except for HPI.     Current Outpatient Medications   Medication Instructions    atorvastatin (LIPITOR) 40 mg, oral, Nightly    finasteride (PROSCAR) 5 mg, oral, Daily    multivitamin (Daily-Jenn, with folic acid,) tablet 1 tablet, oral, Daily    polyvinyl alcohol (Liquifilm Tears) 1.4 % ophthalmic solution      sildenafil (VIAGRA) 100 mg, oral, As needed, Take 30-60 minutes before intercourse. Do not use more often than once a day. Use Condoms.    tamsulosin (FLOMAX) 0.4 mg, oral, Nightly    Vitamin D3 25 mcg, oral, Daily    walker misc One rolatere walker for support in patient with C2 fracture now healed.       No past medical history on file.     No past surgical history on file.     No family history on file.     Allergies   Allergen Reactions    Shellfish Containing Products Swelling     face swells up and itching        Objective     There were no vitals filed for this visit.     Physical Exam  General: NAD, well groomed, well nourished  Eyes: Non-icteric sclera, EOMI  Ears, Nose, Mouth, and Throat: External ears and nose appear  to be without deformity or rash. No lesions or masses noted. Hearing is grossly intact.   Neck: Trachea midline  Respiratory: Nonlabored breathing   Cardiovascular: no peripheral edema   Skin: No rashes or open lesions/ulcers identified on skin.    ROTATOR CUFF: [+] Empty Can , pain at 90 degrees elevation.      Psychiatric: Alert, orientation to person, place, and time. Cooperative.    Imaging personally reviewed and independently interpreted.    Assessment/Plan      74 years old male with past medical history of spinal cord injurie from 1980s, currently wheelchair bound with PMH of L rotator cuff arthropathy , presents with left shoulder pain for the last 2 years that has been bothering him more recently, patient describes the pain as sharp and aggravated by movement at the shoulder, he rates the pain as 7-8 out of 10, there is limited range of motion and diffuse weakness to the left shoulder,      Plan:  -Suprascapular nerve block was performed in office, patient stated that his pain is around 5, plan to follow-up next week        Follow up:  in a week.     The patient was invited to contact us back anytime with any questions or concerns and follow-up with us in the office as needed.     Diagnoses and all orders for this visit:  Chronic left shoulder pain  -     Referral to Pain Medicine  Multiple joint pain  -     Referral to Pain Medicine      The patient was given an opportunity to ask questions and were answered. The patient verbalized understanding and was agreeable to plan.        Sean Santiago MD  Anesthesiology PGY-2  [facility]

## 2024-12-17 ENCOUNTER — DOCUMENTATION (OUTPATIENT)
Dept: OCCUPATIONAL THERAPY | Facility: HOSPITAL | Age: 74
End: 2024-12-17
Payer: MEDICARE

## 2024-12-17 NOTE — PROGRESS NOTES
Discharge Summary    Name: Monae Berman  MRN: [id number]  : 1950  Date: 24    Discharge Summary: OT    Discharge Information: Date of evaluation 1025, Number of attended visits 1, Referred by Sami, and Referred for L shoulder pain    Therapy Summary: wound/scar/edema management, pain, weakness, paresthesia, splinting, HEP, functional hand use      Discharge Status: unknown     Rehab Discharge Reason: Other Patient was called to schedule after his authorization was approved, however his voice mailbox was not set up

## 2025-02-04 ENCOUNTER — APPOINTMENT (OUTPATIENT)
Dept: OPHTHALMOLOGY | Facility: CLINIC | Age: 75
End: 2025-02-04
Payer: MEDICARE

## 2025-03-05 ENCOUNTER — APPOINTMENT (OUTPATIENT)
Dept: PRIMARY CARE | Facility: CLINIC | Age: 75
End: 2025-03-05
Payer: MEDICARE

## 2025-03-28 ENCOUNTER — APPOINTMENT (OUTPATIENT)
Dept: OPHTHALMOLOGY | Facility: CLINIC | Age: 75
End: 2025-03-28
Payer: MEDICARE

## 2025-04-02 ENCOUNTER — APPOINTMENT (OUTPATIENT)
Dept: PRIMARY CARE | Facility: CLINIC | Age: 75
End: 2025-04-02
Payer: MEDICARE

## 2025-05-27 DIAGNOSIS — N52.9 ERECTILE DISORDER: ICD-10-CM

## 2025-05-27 DIAGNOSIS — Z00.00 HEALTHCARE MAINTENANCE: ICD-10-CM

## 2025-05-27 NOTE — TELEPHONE ENCOUNTER
This is not a medication that requires a refill before the patient is seen again. I thought he was actually in LTC now.

## 2025-05-29 RX ORDER — SILDENAFIL 100 MG/1
100 TABLET, FILM COATED ORAL AS NEEDED
Qty: 30 TABLET | Refills: 3 | OUTPATIENT
Start: 2025-05-29

## 2025-05-29 RX ORDER — MULTIVITAMIN WITH FOLIC ACID 400 MCG
1 TABLET ORAL DAILY
Qty: 90 TABLET | Refills: 0 | OUTPATIENT
Start: 2025-05-29

## 2025-06-11 ENCOUNTER — OFFICE VISIT (OUTPATIENT)
Dept: PRIMARY CARE | Facility: CLINIC | Age: 75
End: 2025-06-11
Payer: MEDICARE

## 2025-06-11 VITALS
BODY MASS INDEX: 23.25 KG/M2 | DIASTOLIC BLOOD PRESSURE: 80 MMHG | HEART RATE: 83 BPM | TEMPERATURE: 98 F | SYSTOLIC BLOOD PRESSURE: 114 MMHG | RESPIRATION RATE: 16 BRPM | OXYGEN SATURATION: 97 % | WEIGHT: 171.4 LBS

## 2025-06-11 DIAGNOSIS — Z00.00 HEALTHCARE MAINTENANCE: ICD-10-CM

## 2025-06-11 DIAGNOSIS — N40.1 BENIGN PROSTATIC HYPERPLASIA WITH URINARY FREQUENCY: ICD-10-CM

## 2025-06-11 DIAGNOSIS — R35.0 BENIGN PROSTATIC HYPERPLASIA WITH URINARY FREQUENCY: ICD-10-CM

## 2025-06-11 DIAGNOSIS — R39.9 LOWER URINARY TRACT SYMPTOMS (LUTS): ICD-10-CM

## 2025-06-11 DIAGNOSIS — B18.2 CHRONIC HEPATITIS C WITHOUT HEPATIC COMA (MULTI): ICD-10-CM

## 2025-06-11 DIAGNOSIS — E55.9 HYPOVITAMINOSIS D: ICD-10-CM

## 2025-06-11 DIAGNOSIS — Z00.00 ROUTINE GENERAL MEDICAL EXAMINATION AT HEALTH CARE FACILITY: Primary | ICD-10-CM

## 2025-06-11 DIAGNOSIS — N52.9 ERECTILE DISORDER: ICD-10-CM

## 2025-06-11 DIAGNOSIS — E78.5 HYPERLIPIDEMIA, UNSPECIFIED HYPERLIPIDEMIA TYPE: ICD-10-CM

## 2025-06-11 DIAGNOSIS — F14.988: ICD-10-CM

## 2025-06-11 PROCEDURE — 1125F AMNT PAIN NOTED PAIN PRSNT: CPT | Performed by: NURSE PRACTITIONER

## 2025-06-11 PROCEDURE — 1170F FXNL STATUS ASSESSED: CPT | Performed by: NURSE PRACTITIONER

## 2025-06-11 PROCEDURE — 1159F MED LIST DOCD IN RCRD: CPT | Performed by: NURSE PRACTITIONER

## 2025-06-11 PROCEDURE — 1160F RVW MEDS BY RX/DR IN RCRD: CPT | Performed by: NURSE PRACTITIONER

## 2025-06-11 PROCEDURE — 99214 OFFICE O/P EST MOD 30 MIN: CPT | Performed by: NURSE PRACTITIONER

## 2025-06-11 PROCEDURE — G0444 DEPRESSION SCREEN ANNUAL: HCPCS | Performed by: NURSE PRACTITIONER

## 2025-06-11 PROCEDURE — 99497 ADVNCD CARE PLAN 30 MIN: CPT | Performed by: NURSE PRACTITIONER

## 2025-06-11 PROCEDURE — 99497 ADVNCD CARE PLAN 30 MIN: CPT | Mod: 33,25 | Performed by: NURSE PRACTITIONER

## 2025-06-11 RX ORDER — MULTIVITAMIN WITH FOLIC ACID 400 MCG
1 TABLET ORAL DAILY
Qty: 90 TABLET | Refills: 0 | Status: SHIPPED | OUTPATIENT
Start: 2025-06-11

## 2025-06-11 RX ORDER — AMPICILLIN TRIHYDRATE 500 MG
25 CAPSULE ORAL DAILY
Qty: 90 CAPSULE | Refills: 3 | Status: SHIPPED | OUTPATIENT
Start: 2025-06-11 | End: 2026-06-11

## 2025-06-11 RX ORDER — SILDENAFIL 100 MG/1
100 TABLET, FILM COATED ORAL AS NEEDED
Qty: 30 TABLET | Refills: 3 | Status: SHIPPED | OUTPATIENT
Start: 2025-06-11

## 2025-06-11 RX ORDER — FINASTERIDE 5 MG/1
5 TABLET, FILM COATED ORAL DAILY
Qty: 90 TABLET | Refills: 3 | Status: SHIPPED | OUTPATIENT
Start: 2025-06-11 | End: 2026-06-11

## 2025-06-11 RX ORDER — ATORVASTATIN CALCIUM 40 MG/1
40 TABLET, FILM COATED ORAL NIGHTLY
Qty: 90 TABLET | Refills: 3 | Status: SHIPPED | OUTPATIENT
Start: 2025-06-11 | End: 2026-06-11

## 2025-06-11 RX ORDER — TAMSULOSIN HYDROCHLORIDE 0.4 MG/1
0.4 CAPSULE ORAL NIGHTLY
Qty: 90 CAPSULE | Refills: 3 | Status: SHIPPED | OUTPATIENT
Start: 2025-06-11 | End: 2026-06-11

## 2025-06-11 SDOH — ECONOMIC STABILITY: FOOD INSECURITY: WITHIN THE PAST 12 MONTHS, THE FOOD YOU BOUGHT JUST DIDN'T LAST AND YOU DIDN'T HAVE MONEY TO GET MORE.: SOMETIMES TRUE

## 2025-06-11 SDOH — ECONOMIC STABILITY: FOOD INSECURITY: WITHIN THE PAST 12 MONTHS, YOU WORRIED THAT YOUR FOOD WOULD RUN OUT BEFORE YOU GOT MONEY TO BUY MORE.: SOMETIMES TRUE

## 2025-06-11 ASSESSMENT — ENCOUNTER SYMPTOMS
DEPRESSION: 0
LOSS OF SENSATION IN FEET: 0
OCCASIONAL FEELINGS OF UNSTEADINESS: 0

## 2025-06-11 ASSESSMENT — ACTIVITIES OF DAILY LIVING (ADL)
DOING_HOUSEWORK: NEEDS ASSISTANCE
BATHING: NEEDS ASSISTANCE
GROCERY_SHOPPING: NEEDS ASSISTANCE
MANAGING_FINANCES: NEEDS ASSISTANCE
DRESSING: NEEDS ASSISTANCE
TAKING_MEDICATION: NEEDS ASSISTANCE

## 2025-06-11 ASSESSMENT — PAIN SCALES - GENERAL: PAINLEVEL_OUTOF10: 7

## 2025-06-11 ASSESSMENT — LIFESTYLE VARIABLES: HOW OFTEN DO YOU HAVE SIX OR MORE DRINKS ON ONE OCCASION: LESS THAN MONTHLY

## 2025-06-11 NOTE — PATIENT INSTRUCTIONS
Good to see you today. We are working to get your home health reinstated. I will explore how to replace the cushions on your chair.

## 2025-06-19 PROBLEM — F14.988: Status: ACTIVE | Noted: 2025-06-19

## 2025-06-19 PROBLEM — B18.2 CHRONIC HEPATITIS C WITHOUT HEPATIC COMA (MULTI): Status: ACTIVE | Noted: 2025-06-19

## 2025-06-19 NOTE — PROGRESS NOTES
Subjective   Reason for Visit: Monae Berman is an 74 y.o. male here for a Medicare Wellness visit.          Reviewed all medications by prescribing practitioner or clinical pharmacist (such as prescriptions, OTCs, herbal therapies and supplements) and documented in the medical record.    HPI    Patient Care Team:  ALEKS Rodrigues DNP as PCP - General  ALEKS Rodrigues DNP as PCP - [facility] PCP  ALEKS Rodrigues DNP as Primary Care Provider     Review of Systems    Objective   Vitals:  /80   Pulse 83   Temp 36.7 °C (98 °F) (Temporal)   Resp 16   Wt 77.7 kg (171 lb 6.4 oz)   SpO2 97%   BMI 23.25 kg/m²       Physical Exam    Assessment & Plan  Erectile disorder    Orders:  •  sildenafil (Viagra) 100 mg tablet; Take 1 tablet (100 mg) by mouth if needed for erectile dysfunction. Take 30-60 minutes before intercourse. Do not use more often than once a day. Use Condoms.    Benign prostatic hyperplasia with urinary frequency    Orders:  •  finasteride (Proscar) 5 mg tablet; Take 1 tablet (5 mg) by mouth once daily.    Healthcare maintenance    Orders:  •  multivitamin (Daily-Jenn, with folic acid,) tablet; Take 1 tablet by mouth once daily.    Hyperlipidemia, unspecified hyperlipidemia type    Orders:  •  atorvastatin (Lipitor) 40 mg tablet; Take 1 tablet (40 mg) by mouth once daily at bedtime.    Hypovitaminosis D    Orders:  •  Vitamin D3 25 mcg (1,000 unit) capsule; Take 1 capsule (25 mcg) by mouth once daily.    Lower urinary tract symptoms (LUTS)    Orders:  •  tamsulosin (Flomax) 0.4 mg 24 hr capsule; Take 1 capsule (0.4 mg) by mouth once daily at bedtime.    Routine general medical examination at health care facility    Orders:  •  1 Year Follow Up In Primary Care - Wellness Exam; Future  •  1 Year Follow Up In Primary Care - Wellness Exam; Future

## 2025-06-19 NOTE — ASSESSMENT & PLAN NOTE
Orders:  •  tamsulosin (Flomax) 0.4 mg 24 hr capsule; Take 1 capsule (0.4 mg) by mouth once daily at bedtime.

## 2025-06-19 NOTE — ASSESSMENT & PLAN NOTE
Orders:  •  Vitamin D3 25 mcg (1,000 unit) capsule; Take 1 capsule (25 mcg) by mouth once daily.

## 2025-06-19 NOTE — PROGRESS NOTES
Subjective   Patient ID: Monae Berman is a 74 y.o. male who presents for Medicare Annual Wellness Visit Subsequent (Wellness exam).    HPI   Here for follow up. PMH significant for an accident falling off of a roof. Initially was unable to move extremities, but now has arm movement and some leg strength with left hemiplegia. He has been referred to PT for shoulder pain and overall deconditioning, but was a no show for multiple visits. He is Hep C positive and has not been seen by hepatology since May 2023 was to be seen in 3 months.  Labs done at that time patient was positive for Hep c antibodies, liver scan should mild fibrotic changes, has not received treatment.   In March he had severe shoulder pain and called 911 threatening to cut off his arm was transferred to the ED initial assessment for suicidal ideation. Discharged 4 days later with diagnosis of Shoulder pain. Evaluation by Psych recommended Cymbalta 20mg daily. Went to a SNF for rehab. Now has returned to his home needs Home Health Aide reinstated. Also requesting Viagra.   Discussed need to follow up with hepatology.  Review of Systems  12 point review of systems including (Constitutional, Eyes, ENMT, Respiratory, Cardiac, Gastrointestinal, Neurological, Psychiatric, and Hematologic) was performed and is otherwise negative   Objective   /80   Pulse 83   Temp 36.7 °C (98 °F) (Temporal)   Resp 16   Wt 77.7 kg (171 lb 6.4 oz)   SpO2 97%   BMI 23.25 kg/m²     Physical Exam  General: Poorly groomed. Mood appropriate.  HEENT: MMM   Chest: CTA  Heart: RRR  Ext: no edema  MS: Presents in an electric wheel chair  Assessment/Plan   Diagnoses and all orders for this visit:  Routine general medical examination at health care facility  -     1 Year Follow Up In Primary Care - Wellness Exam; Future  Erectile disorder  -     sildenafil (Viagra) 100 mg tablet; Take 1 tablet (100 mg) by mouth if needed for erectile dysfunction. Take 30-60 minutes before  intercourse. Do not use more often than once a day. Use Condoms.  Benign prostatic hyperplasia with urinary frequency  -     finasteride (Proscar) 5 mg tablet; Take 1 tablet (5 mg) by mouth once daily.  Healthcare maintenance  -     multivitamin (Daily-Jenn, with folic acid,) tablet; Take 1 tablet by mouth once daily.  Hyperlipidemia, unspecified hyperlipidemia type  -     atorvastatin (Lipitor) 40 mg tablet; Take 1 tablet (40 mg) by mouth once daily at bedtime.  Hypovitaminosis D  -     Vitamin D3 25 mcg (1,000 unit) capsule; Take 1 capsule (25 mcg) by mouth once daily.  Lower urinary tract symptoms (LUTS)  -     tamsulosin (Flomax) 0.4 mg 24 hr capsule; Take 1 capsule (0.4 mg) by mouth once daily at bedtime.

## 2025-06-19 NOTE — PROGRESS NOTES
Subjective   Reason for Visit: Monae Berman is an 74 y.o. male here for a Medicare Wellness visit.               HPI    Patient Care Team:  ALEKS Rodrigues DNP as PCP - General  ALEKS Rodrigues DNP as PCP - [facility] PCP  ALEKS Rodrigues DNP as Primary Care Provider     Review of Systems    Objective   Vitals:  /80   Pulse 83   Temp 36.7 °C (98 °F) (Temporal)   Resp 16   Wt 77.7 kg (171 lb 6.4 oz)   SpO2 97%   BMI 23.25 kg/m²       Physical Exam    Assessment & Plan  Erectile disorder    Orders:  •  sildenafil (Viagra) 100 mg tablet; Take 1 tablet (100 mg) by mouth if needed for erectile dysfunction. Take 30-60 minutes before intercourse. Do not use more often than once a day. Use Condoms.    Benign prostatic hyperplasia with urinary frequency    Orders:  •  finasteride (Proscar) 5 mg tablet; Take 1 tablet (5 mg) by mouth once daily.    Healthcare maintenance    Orders:  •  multivitamin (Daily-Jenn, with folic acid,) tablet; Take 1 tablet by mouth once daily.    Hyperlipidemia, unspecified hyperlipidemia type    Orders:  •  atorvastatin (Lipitor) 40 mg tablet; Take 1 tablet (40 mg) by mouth once daily at bedtime.    Hypovitaminosis D    Orders:  •  Vitamin D3 25 mcg (1,000 unit) capsule; Take 1 capsule (25 mcg) by mouth once daily.    Lower urinary tract symptoms (LUTS)    Orders:  •  tamsulosin (Flomax) 0.4 mg 24 hr capsule; Take 1 capsule (0.4 mg) by mouth once daily at bedtime.    Routine general medical examination at health care facility    Orders:  •  1 Year Follow Up In Primary Care - Wellness Exam; Future

## 2025-06-19 NOTE — ASSESSMENT & PLAN NOTE
Orders:  •  sildenafil (Viagra) 100 mg tablet; Take 1 tablet (100 mg) by mouth if needed for erectile dysfunction. Take 30-60 minutes before intercourse. Do not use more often than once a day. Use Condoms.

## 2025-07-16 ENCOUNTER — APPOINTMENT (OUTPATIENT)
Dept: PRIMARY CARE | Facility: CLINIC | Age: 75
End: 2025-07-16
Payer: MEDICARE

## 2025-08-27 DIAGNOSIS — M25.50 MULTIPLE JOINT PAIN: Primary | ICD-10-CM
